# Patient Record
(demographics unavailable — no encounter records)

---

## 2024-10-15 NOTE — ASSESSMENT
[FreeTextEntry1] : Patient here for followup of throiat disocomfort - minimal change with omeprazole 20 mgm - however seems to be helped by atrovent for his rhiniits .  No laryngeal lesion seen  and still has findings of LPR , however due to continued sx will have patient evaluated by laryngology.   WIll continue reflux diet and omeprazole and atrovent - follow ujp 4-6 weeks.

## 2024-10-15 NOTE — PHYSICAL EXAM
[] : septum deviated to the right [Midline] : trachea located in midline position [Laryngoscopy Performed] : laryngoscopy was performed, see procedure section for findings [Normal] : no rashes [de-identified] : mod hypertrophy

## 2024-10-15 NOTE — REVIEW OF SYSTEMS
[As Noted in HPI] : as noted in HPI [Hoarseness] : hoarseness [Throat Clearing] : throat clearing [Throat Dryness] : throat dryness [Throat Pain] : no throat pain [Throat Itching] : no throat itching [de-identified] : f/u throat irritation

## 2024-10-18 NOTE — PHYSICAL EXAM
[No Acute Distress] : no acute distress [Well Nourished] : well nourished [Well Developed] : well developed [Well-Appearing] : well-appearing [Normal Voice/Communication] : normal voice/communication [Normal Sclera/Conjunctiva] : normal sclera/conjunctiva [Normal Outer Ear/Nose] : the outer ears and nose were normal in appearance [No JVD] : no jugular venous distention [No Respiratory Distress] : no respiratory distress  [No Accessory Muscle Use] : no accessory muscle use [Clear to Auscultation] : lungs were clear to auscultation bilaterally [Normal Rate] : normal rate  [Regular Rhythm] : with a regular rhythm [Normal S1, S2] : normal S1 and S2 [Soft] : abdomen soft [Non Tender] : non-tender [Non-distended] : non-distended [No Spinal Tenderness] : no spinal tenderness [Grossly Normal Strength/Tone] : grossly normal strength/tone [No Rash] : no rash [Coordination Grossly Intact] : coordination grossly intact [No Focal Deficits] : no focal deficits [Normal Gait] : normal gait [Speech Grossly Normal] : speech grossly normal [Normal Affect] : the affect was normal [Alert and Oriented x3] : oriented to person, place, and time [Normal Mood] : the mood was normal [Normal Insight/Judgement] : insight and judgment were intact [de-identified] : 1  + edema

## 2024-10-18 NOTE — HISTORY OF PRESENT ILLNESS
[FreeTextEntry1] : pt here for f/u and flu shot [de-identified] : no chest pain, no sob, no cough, no fever, no dizziness, no abdominal pain, no n/v/d/c/melena/brbpr/hematuria/dysuria

## 2024-10-18 NOTE — ASSESSMENT
[FreeTextEntry1] : recent labs showed borderline elevated titer babesia will repeat lab to confirm followup lab flu shot given

## 2024-11-06 NOTE — HISTORY OF PRESENT ILLNESS
[FreeTextEntry1] : Pt is a 83 y/o M PMH CAD s/p PCI [facility] about 2021, mild AS, HLD, HTN   He is scheduled for L ankle surgery [facility] 12/06/2024 He is feeling well overall - denies CP, SOB, diaphoresis, palpitations, dizziness, syncope, LE edema, PND, orthopnea.  He is accompanied by his wife Home 120's/70's  TTE 07/2024 EF 60-65%, mild AS pharm nuc stress test 08/2024 small sized inf inferoapical infarct CUS 08/2024 mild plaque   Smoking status: never ETOH abuse in the past - quit in his 40's no drug use Current exercise: none Daily caffeine intake: none OTC medications: chondroitin

## 2024-11-06 NOTE — DISCUSSION/SUMMARY
[EKG obtained to assist in diagnosis and management of assessed problem(s)] : EKG obtained to assist in diagnosis and management of assessed problem(s) [FreeTextEntry1] : Pt is a 81 y/o M PMH CAD s/p PCI [facility] [place] about 2021, HLD, HTN   He is scheduled for L ankle surgery [facility] 12/06/2024 He is feeling well overall - denies CP, SOB, diaphoresis, palpitations, dizziness, syncope, LE edema, PND, orthopnea.   TTE 07/2024 EF 60-65%, mild AS pharm nuc stress test 08/2024 small sized inf inferoapical infarct CUS 08/2024 mild plaque  At this time the pt has no signs or symptoms of active ischemia, heart failure, life-threatening arrhythmias, severe valvular pathology. VSS There are no cardiac contraindications for planned procedure.   CAD: s/p PCI 2021 pt is asymptomatic normal LV function c/w Brilinta 90mg bid c/w lasix 20 mg qd c/w ramipril c/w statin - goal LDL <70  We discussed the importance of aggressive risk factor modification, including continuing medications as directed, following a healthy diet of unprocessed, low saturated fat and carbohydrate diet as close to a plant based or Mediterranean as possible, regular exercise at least 30 minutes of cardiovascular exercise daily.  Also advised to report any symptoms immediately.   HTN: elevated today  start metoprolol succ 25mg qd c/w ramipril 10mg qd Discussed the long-term health risks of uncontrolled BP.  Advised low salt diet, regular exercise, maintaining ideal weight. Encouraged pt to check BP at home and keep journal   Pt will return in 6 mnths or sooner as needed but I encouraged communication via phone or portal if necessary.  We will call pt with test results when applicable and arrange for expedited follow up if necessary.  Most recent available lab results were reviewed with pt. The described plan was discussed with the pt and wife.  All questions and concerns were addressed to the best of my knowledge.

## 2024-11-11 NOTE — CONSULT LETTER
[Dear  ___] : Dear  [unfilled], [Consult Letter:] : I had the pleasure of evaluating your patient, [unfilled]. [Please see my note below.] : Please see my note below. [Sincerely,] : Sincerely, [FreeTextEntry3] : Justice Willams MD FACP Diplomates American Board of Internal Medicine and Infectious Diseases [facility] [place] [DrTy  ___] : Dr. SOMERS

## 2024-11-11 NOTE — CONSULT LETTER
[Dear  ___] : Dear  [unfilled], [Consult Letter:] : I had the pleasure of evaluating your patient, [unfilled]. [Please see my note below.] : Please see my note below. [Sincerely,] : Sincerely, [FreeTextEntry3] : Justice Willams MD FACP Diplomates American Board of Internal Medicine and Infectious Diseases [facility] [place] [DrTy  ___] : Dr. SOMERS

## 2024-11-11 NOTE — REVIEW OF SYSTEMS
[Joint Swelling] : joint swelling [Joint Stiffness] : joint stiffness [As Noted in HPI] : as noted in HPI [Negative] : Integumentary

## 2024-11-11 NOTE — PHYSICAL EXAM
[General Appearance - Alert] : alert [General Appearance - In No Acute Distress] : in no acute distress [Sclera] : the sclera and conjunctiva were normal [PERRL With Normal Accommodation] : pupils were equal in size, round, reactive to light [Extraocular Movements] : extraocular movements were intact [Outer Ear] : the ears and nose were normal in appearance [Oropharynx] : the oropharynx was normal with no thrush [Neck Appearance] : the appearance of the neck was normal [Neck Cervical Mass (___cm)] : no neck mass was observed [Jugular Venous Distention Increased] : there was no jugular-venous distention [Thyroid Diffuse Enlargement] : the thyroid was not enlarged [Heart Rate And Rhythm] : heart rate was normal and rhythm regular [Heart Sounds] : normal S1 and S2 [Heart Sounds Gallop] : no gallops [Murmurs] : no murmurs [Heart Sounds Pericardial Friction Rub] : no pericardial rub [Full Pulse] : the pedal pulses are present [Edema] : there was no peripheral edema [Bowel Sounds] : normal bowel sounds [Abdomen Soft] : soft [Abdomen Tenderness] : non-tender [Abdomen Mass (___ Cm)] : no abdominal mass palpated [Costovertebral Angle Tenderness] : no CVA tenderness [No Palpable Adenopathy] : no palpable adenopathy [Musculoskeletal - Swelling] : no joint swelling [Nail Clubbing] : no clubbing  or cyanosis of the fingernails [Motor Tone] : muscle strength and tone were normal [Skin Color & Pigmentation] : normal skin color and pigmentation [] : no rash

## 2024-11-11 NOTE — ASSESSMENT
[FreeTextEntry1] :  Patient was referred here for evaluation of borderline positive babesiosis serology which on repeat is negative.  Discussed with patient that this is an tick related infection that does not cause any memory issues.  However since his blood test is negative there is no further workup that is required.  Review of patient's labs reveal it is unclear if a true syphilis test was done versus a screening RPR so that was repeated.  There is no evidence of any infectious process or tick related illness all issues regarding patient's health and medical problems have been discussed. The patient understands and concurs with the treatment plan.

## 2024-11-13 NOTE — PHYSICAL EXAM
[No Acute Distress] : no acute distress [Well Nourished] : well nourished [Well Developed] : well developed [Well-Appearing] : well-appearing [Normal Voice/Communication] : normal voice/communication [Normal Sclera/Conjunctiva] : normal sclera/conjunctiva [PERRL] : pupils equal round and reactive to light [EOMI] : extraocular movements intact [Normal Outer Ear/Nose] : the outer ears and nose were normal in appearance [Normal Oropharynx] : the oropharynx was normal [Normal TMs] : both tympanic membranes were normal [No JVD] : no jugular venous distention [No Lymphadenopathy] : no lymphadenopathy [Supple] : supple [Thyroid Normal, No Nodules] : the thyroid was normal and there were no nodules present [No Respiratory Distress] : no respiratory distress  [No Accessory Muscle Use] : no accessory muscle use [Clear to Auscultation] : lungs were clear to auscultation bilaterally [Normal Rate] : normal rate  [Regular Rhythm] : with a regular rhythm [Normal S1, S2] : normal S1 and S2 [No Murmur] : no murmur heard [No Palpable Aorta] : no palpable aorta [No Extremity Clubbing/Cyanosis] : no extremity clubbing/cyanosis [Soft] : abdomen soft [Non Tender] : non-tender [Non-distended] : non-distended [No Masses] : no abdominal mass palpated [No HSM] : no HSM [Normal Bowel Sounds] : normal bowel sounds [Normal Posterior Cervical Nodes] : no posterior cervical lymphadenopathy [Normal Anterior Cervical Nodes] : no anterior cervical lymphadenopathy [No CVA Tenderness] : no CVA  tenderness [No Spinal Tenderness] : no spinal tenderness [No Joint Swelling] : no joint swelling [Grossly Normal Strength/Tone] : grossly normal strength/tone [No Rash] : no rash [Coordination Grossly Intact] : coordination grossly intact [No Focal Deficits] : no focal deficits [Normal Gait] : normal gait [Speech Grossly Normal] : speech grossly normal [Normal Affect] : the affect was normal [Alert and Oriented x3] : oriented to person, place, and time [Normal Mood] : the mood was normal [Normal Insight/Judgement] : insight and judgment were intact [de-identified] : left ankle swelling

## 2024-11-13 NOTE — HISTORY OF PRESENT ILLNESS
[Aortic Stenosis] : aortic stenosis [Coronary Artery Disease] : coronary artery disease [No Pertinent Pulmonary History] : no history of asthma, COPD, sleep apnea, or smoking [No Adverse Anesthesia Reaction] : no adverse anesthesia reaction in self or family member [Chronic Kidney Disease] : chronic kidney disease [Diabetes] : diabetes [(Patient denies any chest pain, claudication, dyspnea on exertion, orthopnea, palpitations or syncope)] : Patient denies any chest pain, claudication, dyspnea on exertion, orthopnea, palpitations or syncope [Atrial Fibrillation] : no atrial fibrillation [Recent Myocardial Infarction] : no recent myocardial infarction [Implantable Device/Pacemaker] : no implantable device/pacemaker [Chronic Anticoagulation] : no chronic anticoagulation [FreeTextEntry1] : left ankle surgery [FreeTextEntry2] : 12/6/24 [FreeTextEntry3] : Dr. barragan [FreeTextEntry4] : pt is having PST in the hospital, and he went to cardiology about a week ago.  no chest pain, no sob, no cough, no fever, no dizziness, no abdominal pain, no n/v/d/c/melena/brbpr/hematuria/dysuria

## 2024-11-13 NOTE — ASSESSMENT
[Patient Optimized for Surgery] : Patient optimized for surgery [As per surgery] : as per surgery [FreeTextEntry4] : 81 yo male with pmh of htn, cad s/p pci  , gout , hypothyroid, here for clearance for left ankle surgery

## 2024-11-13 NOTE — PLAN
[FreeTextEntry1] : labs stable  vital stable  cardio clearance in chart  pt medically optimized for procedure

## 2024-11-19 NOTE — HISTORY OF PRESENT ILLNESS
[de-identified] : JOEL PALMA  is a 82 year old man who presents to the [facility] with a chief complaint of throat discomfort. He is referred by Dr. Joel Khoury. They have had pain in their throat for years. They do have a prior CT neck - 4 years ago They do not have prior smoking history. They do have prior history of alcoholism/alcohol abuse - sober for 40 years Throat pain is absent when swallowing solids or liquids. They do not have weight loss in the past 3 months. They have not altered their diet because of this pain. They do not have frequent ear pain. Pain is absent when chewing. They have not had a prior swallow study in the past 1 year. They have seen the following types of providers for this problem: Dr. Khoury  They have taken the following medications for this problem: throat lozenges Doing or taking the following makes the pain better: drinking milk  Doing the following makes the pain worse: swallowing pills Is followed by outside GI. Previously underwent EGD in 2020.  Denies any dysphagia or odynophagia.

## 2024-11-19 NOTE — PROCEDURE
[de-identified] : Procedure: Transnasal flexible laryngoscopy  Description: Informed consent was obtained from the patient prior to the procedure. The patient was seated in the clinic chair. Topical anesthesia was achieved by first spraying the nasal cavities with 4% lidocaine and 1% phenylephrine.   Exam: This demonstrates a clear vallecula and crisp epiglottis. The aryepiglottic folds are intact and symmetric bilaterally. The hypopharynx does not demonstrate pooling. The interarytenoid space demonstrates no lesions. It does not demonstrate pachydermia. The false vocal folds are symmetric and without lesions or masses. False fold voicing (plica ventricularis) is not noted today. The true vocal folds show normal and symmetric motion bilaterally. There is no paradoxical motion. The right medial edge is crisp and shows no lesions or masses. The left medial edge is crisp and shows no lesions or masses. The mucosal covering is minimally edematous. There is not erythema present today. There are no obvious vascular ectasias present. The vocal processes do not demonstrate granulomas. The subglottis and proximal trachea is clear and unobstructed to the limits of the examination today

## 2024-11-19 NOTE — ASSESSMENT
[FreeTextEntry1] : Assessment/Plan:  #1 Throat discomfort #2 GERD  Patient to continue gabapentin 600mg bid for "nerve pain."    Patient to continue famotidine 20mg and omeprazole 40mg daily.   May follow up as needed.  Will follow up with Dr. Khoury otherwise.

## 2024-11-19 NOTE — HISTORY OF PRESENT ILLNESS
[de-identified] : JOEL PALMA  is a 82 year old man who presents to the [facility] with a chief complaint of throat discomfort. He is referred by Dr. Joel Khoury. They have had pain in their throat for years. They do have a prior CT neck - 4 years ago They do not have prior smoking history. They do have prior history of alcoholism/alcohol abuse - sober for 40 years Throat pain is absent when swallowing solids or liquids. They do not have weight loss in the past 3 months. They have not altered their diet because of this pain. They do not have frequent ear pain. Pain is absent when chewing. They have not had a prior swallow study in the past 1 year. They have seen the following types of providers for this problem: Dr. Khoury  They have taken the following medications for this problem: throat lozenges Doing or taking the following makes the pain better: drinking milk  Doing the following makes the pain worse: swallowing pills Is followed by outside GI. Previously underwent EGD in 2020.  Denies any dysphagia or odynophagia.

## 2024-11-19 NOTE — CONSULT LETTER
[Dear  ___] : Dear  [unfilled], [Consult Letter:] : I had the pleasure of evaluating your patient, [unfilled]. [Please see my note below.] : Please see my note below. [Consult Closing:] : Thank you very much for allowing me to participate in the care of this patient.  If you have any questions, please do not hesitate to contact me. [Sincerely,] : Sincerely, [FreeTextEntry2] : Dr. Joel Khoury [FreeTextEntry3] : Dallin Friedman M.D. Division of Laryngology | Department of Otolaryngology  [address] [address]

## 2024-11-19 NOTE — PROCEDURE
[de-identified] : Procedure: Transnasal flexible laryngoscopy  Description: Informed consent was obtained from the patient prior to the procedure. The patient was seated in the clinic chair. Topical anesthesia was achieved by first spraying the nasal cavities with 4% lidocaine and 1% phenylephrine.   Exam: This demonstrates a clear vallecula and crisp epiglottis. The aryepiglottic folds are intact and symmetric bilaterally. The hypopharynx does not demonstrate pooling. The interarytenoid space demonstrates no lesions. It does not demonstrate pachydermia. The false vocal folds are symmetric and without lesions or masses. False fold voicing (plica ventricularis) is not noted today. The true vocal folds show normal and symmetric motion bilaterally. There is no paradoxical motion. The right medial edge is crisp and shows no lesions or masses. The left medial edge is crisp and shows no lesions or masses. The mucosal covering is minimally edematous. There is not erythema present today. There are no obvious vascular ectasias present. The vocal processes do not demonstrate granulomas. The subglottis and proximal trachea is clear and unobstructed to the limits of the examination today

## 2024-11-19 NOTE — CONSULT LETTER
[Dear  ___] : Dear  [unfilled], [Consult Letter:] : I had the pleasure of evaluating your patient, [unfilled]. [Please see my note below.] : Please see my note below. [Consult Closing:] : Thank you very much for allowing me to participate in the care of this patient.  If you have any questions, please do not hesitate to contact me. [Sincerely,] : Sincerely, [FreeTextEntry2] : Dr. Joel Khoury [FreeTextEntry3] : Dallin Friedman M.D. Division of Laryngology | Department of Otolaryngology  [address] [address]

## 2025-03-07 NOTE — PHYSICAL EXAM
[No Acute Distress] : no acute distress [Well Nourished] : well nourished [Well Developed] : well developed [Well-Appearing] : well-appearing [Normal Voice/Communication] : normal voice/communication [Normal Sclera/Conjunctiva] : normal sclera/conjunctiva [PERRL] : pupils equal round and reactive to light [Normal Outer Ear/Nose] : the outer ears and nose were normal in appearance [No JVD] : no jugular venous distention [No Respiratory Distress] : no respiratory distress  [No Accessory Muscle Use] : no accessory muscle use [Clear to Auscultation] : lungs were clear to auscultation bilaterally [Normal Rate] : normal rate  [Regular Rhythm] : with a regular rhythm [Normal S1, S2] : normal S1 and S2 [No Murmur] : no murmur heard [No Edema] : there was no peripheral edema [No Palpable Aorta] : no palpable aorta [No Extremity Clubbing/Cyanosis] : no extremity clubbing/cyanosis [Soft] : abdomen soft [Non Tender] : non-tender [Non-distended] : non-distended [No Masses] : no abdominal mass palpated [No HSM] : no HSM [Normal Bowel Sounds] : normal bowel sounds [Grossly Normal Strength/Tone] : grossly normal strength/tone [Coordination Grossly Intact] : coordination grossly intact [No Focal Deficits] : no focal deficits [Normal Gait] : normal gait [Speech Grossly Normal] : speech grossly normal [Normal Affect] : the affect was normal [Alert and Oriented x3] : oriented to person, place, and time [Normal Mood] : the mood was normal [Normal Insight/Judgement] : insight and judgment were intact [de-identified] : left foot in boot [de-identified] : right big toe erythema and swelling

## 2025-03-07 NOTE — HISTORY OF PRESENT ILLNESS
[FreeTextEntry1] : Pt follow up  [de-identified] : no chest pain, no sob, no cough, no fever, no dizziness, no abdominal pain, no n/v/d/c/melena/brbpr/hematuria/dysuria admits to chronic gerd and dry mouth  admits to right big toe erythema and swelling from gout

## 2025-03-07 NOTE — ASSESSMENT
[FreeTextEntry1] : follow up labs  see gastroenterology for heart burn persisting  start colchicine for gout exacerbation  followup with cardio  discussed limit water to 64 oz daily

## 2025-05-01 NOTE — HISTORY OF PRESENT ILLNESS
[FreeTextEntry1] : Pt follow up sodium and labs  [de-identified] : no chest pain, no sob, no cough, no fever, no dizziness, no abdominal pain, no n/v/d/c/melena/brbpr/hematuria/dysuria states  called and left a message but was unable to hear call back number  admits to fatigue and needing to see gastroenterology for loose stools

## 2025-05-01 NOTE — PHYSICAL EXAM
[No Acute Distress] : no acute distress [Well Nourished] : well nourished [Well Developed] : well developed [Well-Appearing] : well-appearing [Normal Voice/Communication] : normal voice/communication [Normal Sclera/Conjunctiva] : normal sclera/conjunctiva [PERRL] : pupils equal round and reactive to light [Normal Outer Ear/Nose] : the outer ears and nose were normal in appearance [No JVD] : no jugular venous distention [No Respiratory Distress] : no respiratory distress  [No Accessory Muscle Use] : no accessory muscle use [Clear to Auscultation] : lungs were clear to auscultation bilaterally [Normal Rate] : normal rate  [Regular Rhythm] : with a regular rhythm [Normal S1, S2] : normal S1 and S2 [No Murmur] : no murmur heard [No Edema] : there was no peripheral edema [No Palpable Aorta] : no palpable aorta [No Extremity Clubbing/Cyanosis] : no extremity clubbing/cyanosis [Soft] : abdomen soft [Non Tender] : non-tender [Non-distended] : non-distended [No Masses] : no abdominal mass palpated [No HSM] : no HSM [Normal Bowel Sounds] : normal bowel sounds [Grossly Normal Strength/Tone] : grossly normal strength/tone [Coordination Grossly Intact] : coordination grossly intact [No Focal Deficits] : no focal deficits [Normal Gait] : normal gait [Speech Grossly Normal] : speech grossly normal [Normal Affect] : the affect was normal [Alert and Oriented x3] : oriented to person, place, and time [Normal Mood] : the mood was normal [Normal Insight/Judgement] : insight and judgment were intact

## 2025-06-10 NOTE — HISTORY OF PRESENT ILLNESS
[FreeTextEntry1] : AYAN PALMA is an 83-year-old M with a PMHx of anemia, chronic diarrhea 2/2 cholecystectomy, hiatal hernia, DDD, HTN, obesity presenting to the office today for evaluation of elevated LFT.   Pt sent by PCP for evaluation of elevated AST to 61, steadily increasing since August 2024 though all over LFTs wnl. No new medications nor supplements. History of excessive ETOH use in teens but none x 40 years. PCP ordered abdominal sono performed 11/2024 noting moderate to severe steatosis. There is no itching, weight loss, scleral icterus, jaundice, lower extremity edema.  Reports fecal urgency postprandially requiring daily use of cholestyramine. No bleeding. Notes diet high in saturated fats/meats and admits to extremely low physical activity.

## 2025-06-10 NOTE — ASSESSMENT
[FreeTextEntry1] : 83-year-old M presents for eval of elevated AST on routine blood work with PCP.  Plan: # Elevated liver enzymes: Patient with steadily increasing AST despite normal ALT/alkphosp. US abdomen 11/2024 revealing for moderate to severe steatosis. Will send patient for fibroscan for further characterization/eval of possible cirrhosis given severity of fatty liver and prior excessive ETOH use. Patient and wife counseled at extreme length regarding importance of dietary modifications and increased physical activity.  Provided with referral to registered dietician Bambi Dickerson.  Pt seen and discussed with MD Maria.  I, Dr. Maria, personally performed the evaluation and management (E/M) services for this established patient who presents today with (a) new problem(s)/exacerbation of (an) existing condition(s). That E/M includes conducting the examination, assessing all new/exacerbated conditions, and establishing a new plan of care. Today, my NP Kathrin Causey, was here to observe my evaluation and management services for this new problem/exacerbated condition to be followed going forward.

## 2025-07-16 NOTE — HISTORY OF PRESENT ILLNESS
[FreeTextEntry1] : Pt follow up htn [de-identified] : no chest pain, no sob, no cough, no fever, no dizziness, no abdominal pain, no n/v/d/c/melena/brbpr/hematuria/dysuria admits to stopping furosemide on his own

## 2025-07-16 NOTE — PHYSICAL EXAM
[No Acute Distress] : no acute distress [Well Nourished] : well nourished [Well Developed] : well developed [Well-Appearing] : well-appearing [Normal Voice/Communication] : normal voice/communication [Normal Sclera/Conjunctiva] : normal sclera/conjunctiva [PERRL] : pupils equal round and reactive to light [Normal Outer Ear/Nose] : the outer ears and nose were normal in appearance [No JVD] : no jugular venous distention [No Respiratory Distress] : no respiratory distress  [No Accessory Muscle Use] : no accessory muscle use [Clear to Auscultation] : lungs were clear to auscultation bilaterally [Normal Rate] : normal rate  [Regular Rhythm] : with a regular rhythm [Normal S1, S2] : normal S1 and S2 [No Murmur] : no murmur heard [No Palpable Aorta] : no palpable aorta [No Extremity Clubbing/Cyanosis] : no extremity clubbing/cyanosis [Soft] : abdomen soft [Non Tender] : non-tender [Non-distended] : non-distended [No Masses] : no abdominal mass palpated [No HSM] : no HSM [Normal Bowel Sounds] : normal bowel sounds [Grossly Normal Strength/Tone] : grossly normal strength/tone [Coordination Grossly Intact] : coordination grossly intact [No Focal Deficits] : no focal deficits [Normal Gait] : normal gait [Speech Grossly Normal] : speech grossly normal [Normal Affect] : the affect was normal [Alert and Oriented x3] : oriented to person, place, and time [Normal Mood] : the mood was normal [Normal Insight/Judgement] : insight and judgment were intact [de-identified] : bilateral edema l>R

## 2025-07-16 NOTE — HISTORY OF PRESENT ILLNESS
[FreeTextEntry1] : Pt follow up htn [de-identified] : no chest pain, no sob, no cough, no fever, no dizziness, no abdominal pain, no n/v/d/c/melena/brbpr/hematuria/dysuria admits to stopping furosemide on his own

## 2025-07-16 NOTE — REVIEW OF SYSTEMS
[Feeling Tired] : feeling tired [Loss Of Hearing] : hearing loss [Joint Stiffness] : joint stiffness [Dizziness] : dizziness [Negative] : Heme/Lymph

## 2025-07-16 NOTE — ASSESSMENT
[FreeTextEntry1] : followup labs  us doppler needs compliance with meds see gastro routine cardio followup recommended  follow up 1month

## 2025-07-16 NOTE — ASSESSMENT
[FreeTextEntry1] : 1. CKD-3 - history of bilateral renal artery stenosis with element of ischemic nephropathy.  Use of ACEI will further alter renal hemodynamics.  Creatinine currently stable in comparison to past readings.  Monitor with addition of diuretic back to his regimen.    2. Renal artery stenosis, bilateral - no reported history of recurrent CHF and renal function remains stable.  Continue with current regimen and monitor.   3. Bilateral lower extremity edema - contributing is his lack of using diuretics.  Encouraged compliance with taking daily dosing of diuretic.  Encouraged monitoring of weights at home.  Further recommendations per clinical course.   4. Hypertension - continue with Metoprolol and ACEI.  Elevation in systolic reading may be improved once he restarts diuretics.   5. Anemia - mild.  May be due to CKD.  Will reassess prior to next visit.    Follow up 4 months.

## 2025-07-16 NOTE — HISTORY OF PRESENT ILLNESS
[FreeTextEntry1] : Patient with history of atherosclerotic disease which includes right iliac artery, bilateral renal arteries, hypotension, and hyponatremia.  Orthostatic hypotension may have been occurring for up to two years according to the patient and wife.  Medications were reviewed.  Patient last seen about 2 years ago.  He required holding of ACEI for a period of time, due to hypotension.  Now returns with increasing edema of both lower extremities along with abdominal complaints.  Has not taken diuretic in months.  Previous carotid artery screening noted for mild atherosclerosis and no hemodynamically significant stenosis bilaterally.    Ultrasound 10/31/24 - right 11.6 cm atrophic and echogenic.  Left 10.6 cm, atrophic and echogenic.

## 2025-07-16 NOTE — PHYSICAL EXAM
[General Appearance - Alert] : alert [General Appearance - In No Acute Distress] : in no acute distress [General Appearance - Well Developed] : well developed [General Appearance - Well Nourished] : well nourished [Sclera] : the sclera and conjunctiva were normal [Outer Ear] : the ears and nose were normal in appearance [Neck Appearance] : the appearance of the neck was normal [Jugular Venous Distention Increased] : there was no jugular-venous distention [] : no respiratory distress [Respiration, Rhythm And Depth] : normal respiratory rhythm and effort [Auscultation Breath Sounds / Voice Sounds] : lungs were clear to auscultation bilaterally [Heart Sounds] : normal S1 and S2 [Arterial Pulses Carotid] : carotid pulses were normal with no bruits [Abdomen Soft] : soft [Abdomen Tenderness] : non-tender [No CVA Tenderness] : no ~M costovertebral angle tenderness [Skin Color & Pigmentation] : normal skin color and pigmentation [No Focal Deficits] : no focal deficits [Oriented To Time, Place, And Person] : oriented to person, place, and time [Impaired Insight] : insight and judgment were intact [Affect] : the affect was normal [Mood] : the mood was normal [FreeTextEntry1] : unable to walk due to dizzyness so using wheelchair

## 2025-07-16 NOTE — PHYSICAL EXAM
[No Acute Distress] : no acute distress [Well Nourished] : well nourished [Well Developed] : well developed [Well-Appearing] : well-appearing [Normal Voice/Communication] : normal voice/communication [Normal Sclera/Conjunctiva] : normal sclera/conjunctiva [PERRL] : pupils equal round and reactive to light [Normal Outer Ear/Nose] : the outer ears and nose were normal in appearance [No JVD] : no jugular venous distention [No Respiratory Distress] : no respiratory distress  [No Accessory Muscle Use] : no accessory muscle use [Clear to Auscultation] : lungs were clear to auscultation bilaterally [Normal Rate] : normal rate  [Regular Rhythm] : with a regular rhythm [Normal S1, S2] : normal S1 and S2 [No Murmur] : no murmur heard [No Palpable Aorta] : no palpable aorta [No Extremity Clubbing/Cyanosis] : no extremity clubbing/cyanosis [Soft] : abdomen soft [Non Tender] : non-tender [Non-distended] : non-distended [No Masses] : no abdominal mass palpated [No HSM] : no HSM [Normal Bowel Sounds] : normal bowel sounds [Grossly Normal Strength/Tone] : grossly normal strength/tone [Coordination Grossly Intact] : coordination grossly intact [No Focal Deficits] : no focal deficits [Normal Gait] : normal gait [Speech Grossly Normal] : speech grossly normal [Normal Affect] : the affect was normal [Alert and Oriented x3] : oriented to person, place, and time [Normal Mood] : the mood was normal [Normal Insight/Judgement] : insight and judgment were intact [de-identified] : bilateral edema l>R